# Patient Record
Sex: FEMALE | URBAN - METROPOLITAN AREA
[De-identification: names, ages, dates, MRNs, and addresses within clinical notes are randomized per-mention and may not be internally consistent; named-entity substitution may affect disease eponyms.]

---

## 2021-01-29 ENCOUNTER — APPOINTMENT (RX ONLY)
Dept: URBAN - METROPOLITAN AREA CLINIC 81 | Facility: CLINIC | Age: 26
Setting detail: DERMATOLOGY
End: 2021-01-29

## 2021-01-29 DIAGNOSIS — Z41.9 ENCOUNTER FOR PROCEDURE FOR PURPOSES OTHER THAN REMEDYING HEALTH STATE, UNSPECIFIED: ICD-10-CM

## 2021-01-29 PROCEDURE — ? LASER HAIR REMOVAL

## 2021-01-29 NOTE — PROCEDURE: LASER HAIR REMOVAL
Fluence (Will Not Render If 0): 20
Treatment Number: 0
Post-Procedure Care: Immediate endpoint: perifollicular erythema and edema. Vaseline and ice applied. Post care reviewed with patient.
Eye Shield Text: Given the treatment area eye shields were inserted prior to treatment.
Spot Size: 18 mm
Fluence (Will Not Render If 0): 20
Shaving (Optional): The patient shaved at home
Treatment Number: 1
Cooling: DCD setting
Fluence (Will Not Render If 0): 25
Consent: Written consent obtained, risks reviewed including but not limited to crusting, scabbing, blistering, scarring, darker or lighter pigmentary change, paradoxical hair regrowth, incomplete removal of hair and infection.
Fluence (Will Not Render If 0): 14
Detail Level: Detailed
Laser Type: Alexandrite 755nm
Tolerated Procedure (Optional): Tolerated Well
Cooling Override: DCD 40/60
Total Pulses: x
Post-Care Instructions: I reviewed with the patient in detail post-care instructions. Patient should avoid sun for a minimum of 4 weeks before and after treatment.
Were Eye Shields Employed?: No
Spot Size: 12 mm
Pre-Procedure: Prior to proceeding the treatment areas were cleaned and all present put on their eye protection.